# Patient Record
Sex: FEMALE | Race: WHITE | NOT HISPANIC OR LATINO | URBAN - METROPOLITAN AREA
[De-identification: names, ages, dates, MRNs, and addresses within clinical notes are randomized per-mention and may not be internally consistent; named-entity substitution may affect disease eponyms.]

---

## 2017-11-21 ENCOUNTER — IMPORTED ENCOUNTER (OUTPATIENT)
Dept: URBAN - METROPOLITAN AREA CLINIC 50 | Facility: CLINIC | Age: 67
End: 2017-11-21

## 2017-11-30 ENCOUNTER — IMPORTED ENCOUNTER (OUTPATIENT)
Dept: URBAN - METROPOLITAN AREA CLINIC 50 | Facility: CLINIC | Age: 67
End: 2017-11-30

## 2018-07-20 ENCOUNTER — IMPORTED ENCOUNTER (OUTPATIENT)
Dept: URBAN - METROPOLITAN AREA CLINIC 50 | Facility: CLINIC | Age: 68
End: 2018-07-20

## 2018-07-26 ENCOUNTER — IMPORTED ENCOUNTER (OUTPATIENT)
Dept: URBAN - METROPOLITAN AREA CLINIC 50 | Facility: CLINIC | Age: 68
End: 2018-07-26

## 2018-08-06 ENCOUNTER — IMPORTED ENCOUNTER (OUTPATIENT)
Dept: URBAN - METROPOLITAN AREA CLINIC 50 | Facility: CLINIC | Age: 68
End: 2018-08-06

## 2018-08-22 ENCOUNTER — IMPORTED ENCOUNTER (OUTPATIENT)
Dept: URBAN - METROPOLITAN AREA CLINIC 50 | Facility: CLINIC | Age: 68
End: 2018-08-22

## 2018-08-22 NOTE — PATIENT DISCUSSION
"""S/P IOL OS: Sensar AAB00 20.5 +TM. Continue post operative instructions and drops per schedule.  """

## 2018-08-31 ENCOUNTER — IMPORTED ENCOUNTER (OUTPATIENT)
Dept: URBAN - METROPOLITAN AREA CLINIC 50 | Facility: CLINIC | Age: 68
End: 2018-08-31

## 2018-09-05 ENCOUNTER — IMPORTED ENCOUNTER (OUTPATIENT)
Dept: URBAN - METROPOLITAN AREA CLINIC 50 | Facility: CLINIC | Age: 68
End: 2018-09-05

## 2018-09-05 NOTE — PATIENT DISCUSSION
"""S/P IOL OD: Sensar AAB00 20 +TM. Continue post operative instructions and drops per schedule.  """

## 2018-09-14 ENCOUNTER — IMPORTED ENCOUNTER (OUTPATIENT)
Dept: URBAN - METROPOLITAN AREA CLINIC 50 | Facility: CLINIC | Age: 68
End: 2018-09-14

## 2018-10-05 ENCOUNTER — IMPORTED ENCOUNTER (OUTPATIENT)
Dept: URBAN - METROPOLITAN AREA CLINIC 50 | Facility: CLINIC | Age: 68
End: 2018-10-05

## 2019-01-29 ENCOUNTER — IMPORTED ENCOUNTER (OUTPATIENT)
Dept: URBAN - METROPOLITAN AREA CLINIC 50 | Facility: CLINIC | Age: 69
End: 2019-01-29

## 2019-01-31 ENCOUNTER — IMPORTED ENCOUNTER (OUTPATIENT)
Dept: URBAN - METROPOLITAN AREA CLINIC 50 | Facility: CLINIC | Age: 69
End: 2019-01-31

## 2019-02-14 ENCOUNTER — IMPORTED ENCOUNTER (OUTPATIENT)
Dept: URBAN - METROPOLITAN AREA CLINIC 50 | Facility: CLINIC | Age: 69
End: 2019-02-14

## 2020-06-25 ENCOUNTER — IMPORTED ENCOUNTER (OUTPATIENT)
Dept: URBAN - METROPOLITAN AREA CLINIC 50 | Facility: CLINIC | Age: 70
End: 2020-06-25

## 2021-04-18 ASSESSMENT — VISUAL ACUITY
OD_CC: J1+
OS_SC: 20/25-2
OS_CC: 20/25
OS_OTHER: 20/60. 20/100.
OS_CC: J1+
OS_BAT: 20/60
OD_CC: 20/20-1
OS_OTHER: 20/60. 20/60.
OD_SC: 20/25
OD_SC: 20/20
OD_SC: 20/30-
OS_OTHER: 20/60. 20/80.
OS_BAT: 20/60
OD_CC: J1+
OD_OTHER: 20/50. 20/70.
OD_BAT: 20/50
OS_OTHER: 20/60. 20/70.
OS_BAT: 20/60
OS_CC: 20/40-1
OD_PH: 20/30
OS_PH: 20/30
OS_CC: J1+
OS_SC: 20/60
OD_SC: 20/25
OS_PH: 20/25
OS_SC: 20/50
OD_CC: 20/20
OS_BAT: 20/60
OS_CC: 20/60
OS_SC: 20/40
OD_OTHER: 20/60. 20/80.
OS_SC: 20/30-1
OD_BAT: 20/60
OS_SC: 20/25-
OD_SC: 20/20-
OD_OTHER: 20/60. 20/80.
OS_SC: 20/25-
OS_CC: J1+
OD_CC: J1+
OD_OTHER: 20/20. 20/25.
OD_SC: 20/40
OD_BAT: 20/60
OS_CC: J1+
OD_BAT: 20/60
OD_SC: 20/20
OS_SC: 20/40
OD_CC: J1+
OD_OTHER: 20/60. 20/80.
OD_SC: 20/20 PUSH
OD_SC: 20/30
OD_BAT: 20/20

## 2021-04-18 ASSESSMENT — TONOMETRY
OD_IOP_MMHG: 22
OS_IOP_MMHG: 15
OD_IOP_MMHG: 14
OD_IOP_MMHG: 15
OD_IOP_MMHG: 15
OS_IOP_MMHG: 15
OD_IOP_MMHG: 08
OS_IOP_MMHG: 22
OD_IOP_MMHG: 11
OS_IOP_MMHG: 14
OS_IOP_MMHG: 15
OD_IOP_MMHG: 15
OD_IOP_MMHG: 14
OD_IOP_MMHG: 15
OS_IOP_MMHG: 14
OS_IOP_MMHG: 15
OD_IOP_MMHG: 15
OS_IOP_MMHG: 15
OD_IOP_MMHG: 15
OS_IOP_MMHG: 12
OS_IOP_MMHG: 14
OD_IOP_MMHG: 14
OS_IOP_MMHG: 15
OS_IOP_MMHG: 12

## 2021-05-19 ENCOUNTER — COMPREHENSIVE EXAM (OUTPATIENT)
Dept: URBAN - METROPOLITAN AREA CLINIC 49 | Facility: CLINIC | Age: 71
End: 2021-05-19

## 2021-05-19 DIAGNOSIS — H26.491: ICD-10-CM

## 2021-05-19 DIAGNOSIS — H43.813: ICD-10-CM

## 2021-05-19 PROCEDURE — 66821 AFTER CATARACT LASER SURGERY: CPT

## 2021-05-19 PROCEDURE — 92134 CPTRZ OPH DX IMG PST SGM RTA: CPT

## 2021-05-19 PROCEDURE — 92014 COMPRE OPH EXAM EST PT 1/>: CPT

## 2021-05-19 ASSESSMENT — VISUAL ACUITY
OS_CC: 20/40
OU_CC: J2
OD_CC: 20/40-1

## 2021-05-19 ASSESSMENT — TONOMETRY
OD_IOP_MMHG: 14
OD_IOP_MMHG: 11
OS_IOP_MMHG: 14

## 2021-05-19 NOTE — PATIENT DISCUSSION
PCO ([id number] [place] [id number]): Visually significant PCO present on exam today. Recommend YAG laser capsulotomy today  to improve vision and decrease glare symptoms. RBAs of procedure discussed. Patient agrees and wishes to proceed.

## 2021-05-19 NOTE — PROCEDURE NOTE: CLINICAL
PROCEDURE NOTE: YAG Capsulotomy OD. Diagnosis: Posterior Capsular Opacification (PCO). Prep: Mydriacil 1% and Phenylephrine 2.5%. Prior to treatment, the risks/benefits/alternatives were discussed. The patient wished to proceed with procedure. Consent was signed. Proparacaine and brominidine were placed into the operative eye after the eye was dilated. Power = 1.1mJ. Number of pulses = 60. Patient tolerated procedure well and there were no complications. Post Laser instructions given. Rosario Figueroa

## 2021-05-26 ENCOUNTER — YAG POST-OP (OUTPATIENT)
Dept: URBAN - METROPOLITAN AREA CLINIC 49 | Facility: CLINIC | Age: 71
End: 2021-05-26

## 2021-05-26 DIAGNOSIS — H01.026: ICD-10-CM

## 2021-05-26 DIAGNOSIS — H01.023: ICD-10-CM

## 2021-05-26 DIAGNOSIS — Z98.890: ICD-10-CM

## 2021-05-26 PROCEDURE — 99024 POSTOP FOLLOW-UP VISIT: CPT

## 2021-05-26 RX ORDER — ERYTHROMYCIN 5 MG/G: OINTMENT OPHTHALMIC EVERY EVENING

## 2021-05-26 ASSESSMENT — VISUAL ACUITY
OD_SC: 20/20
OS_SC: 20/40
OU_SC: J2

## 2021-05-26 ASSESSMENT — TONOMETRY
OS_IOP_MMHG: 12
OD_IOP_MMHG: 12

## 2021-11-10 ENCOUNTER — PROBLEM (OUTPATIENT)
Dept: URBAN - METROPOLITAN AREA CLINIC 49 | Facility: CLINIC | Age: 71
End: 2021-11-10

## 2021-11-10 DIAGNOSIS — Z98.890: ICD-10-CM

## 2021-11-10 DIAGNOSIS — H26.491: ICD-10-CM

## 2021-11-10 PROCEDURE — 66821 AFTER CATARACT LASER SURGERY: CPT

## 2021-11-10 PROCEDURE — 92014 COMPRE OPH EXAM EST PT 1/>: CPT

## 2021-11-10 ASSESSMENT — TONOMETRY
OS_IOP_MMHG: 12
OD_IOP_MMHG: 12
OD_IOP_MMHG: 11

## 2021-11-10 ASSESSMENT — VISUAL ACUITY
OD_GLARE: 20/80
OD_PH: 20/25
OD_GLARE: 20/50
OD_SC: 20/30
OS_SC: 20/40
OU_CC: J2@16"

## 2021-11-10 NOTE — PROCEDURE NOTE: CLINICAL
PROCEDURE NOTE: YAG Capsulotomy OD. Diagnosis: Posterior Capsular Opacification (PCO). Prep: Mydriacil 1% and Phenylephrine 2.5%. Prior to treatment, the risks/benefits/alternatives were discussed. The patient wished to proceed with procedure. Consent was signed. Proparacaine and brominidine were placed into the operative eye after the eye was dilated. Power = 1.2mJ. Number of pulses = 55. Patient tolerated procedure well and there were no complications. Post Laser instructions given. Queen Acosta

## 2021-12-08 ENCOUNTER — POST-OP (OUTPATIENT)
Dept: URBAN - METROPOLITAN AREA CLINIC 49 | Facility: CLINIC | Age: 71
End: 2021-12-08

## 2021-12-08 DIAGNOSIS — H53.032: ICD-10-CM

## 2021-12-08 DIAGNOSIS — Z98.890: ICD-10-CM

## 2021-12-08 PROCEDURE — 92015 DETERMINE REFRACTIVE STATE: CPT

## 2021-12-08 PROCEDURE — 99024 POSTOP FOLLOW-UP VISIT: CPT

## 2021-12-08 ASSESSMENT — VISUAL ACUITY
OS_SC: 20/40
OD_SC: 20/20
OS_PH: 20/30-2
OU_CC: J1+

## 2021-12-08 ASSESSMENT — TONOMETRY
OS_IOP_MMHG: 12
OD_IOP_MMHG: 12

## 2022-06-01 ENCOUNTER — COMPREHENSIVE EXAM (OUTPATIENT)
Dept: URBAN - METROPOLITAN AREA CLINIC 49 | Facility: CLINIC | Age: 72
End: 2022-06-01

## 2022-06-01 DIAGNOSIS — H04.123: ICD-10-CM

## 2022-06-01 DIAGNOSIS — H35.371: ICD-10-CM

## 2022-06-01 DIAGNOSIS — H43.813: ICD-10-CM

## 2022-06-01 DIAGNOSIS — H53.032: ICD-10-CM

## 2022-06-01 PROCEDURE — 92014 COMPRE OPH EXAM EST PT 1/>: CPT

## 2022-06-01 PROCEDURE — 92134 CPTRZ OPH DX IMG PST SGM RTA: CPT

## 2022-06-01 ASSESSMENT — TONOMETRY
OD_IOP_MMHG: 12
OS_IOP_MMHG: 12

## 2022-06-01 ASSESSMENT — VISUAL ACUITY
OS_PH: 20/30
OD_SC: 20/25
OS_SC: 20/40

## 2023-06-07 ENCOUNTER — COMPREHENSIVE EXAM (OUTPATIENT)
Dept: URBAN - METROPOLITAN AREA CLINIC 49 | Facility: CLINIC | Age: 73
End: 2023-06-07

## 2023-06-07 DIAGNOSIS — H04.123: ICD-10-CM

## 2023-06-07 DIAGNOSIS — H02.88B: ICD-10-CM

## 2023-06-07 DIAGNOSIS — H35.371: ICD-10-CM

## 2023-06-07 DIAGNOSIS — H43.813: ICD-10-CM

## 2023-06-07 DIAGNOSIS — H02.834: ICD-10-CM

## 2023-06-07 DIAGNOSIS — H02.831: ICD-10-CM

## 2023-06-07 DIAGNOSIS — H02.88A: ICD-10-CM

## 2023-06-07 PROCEDURE — 92014 COMPRE OPH EXAM EST PT 1/>: CPT

## 2023-06-07 ASSESSMENT — VISUAL ACUITY
OU_SC: 20/20
OU_CC: J1+
OS_SC: 20/30+2
OD_SC: 20/20-1

## 2023-06-07 ASSESSMENT — TONOMETRY
OS_IOP_MMHG: 12
OD_IOP_MMHG: 13

## 2023-07-19 ENCOUNTER — FOLLOW UP (OUTPATIENT)
Dept: URBAN - METROPOLITAN AREA CLINIC 49 | Facility: CLINIC | Age: 73
End: 2023-07-19

## 2023-07-19 DIAGNOSIS — H02.88A: ICD-10-CM

## 2023-07-19 DIAGNOSIS — H04.123: ICD-10-CM

## 2023-07-19 DIAGNOSIS — H02.88B: ICD-10-CM

## 2023-07-19 PROCEDURE — 92012 INTRM OPH EXAM EST PATIENT: CPT

## 2023-07-19 ASSESSMENT — VISUAL ACUITY
OU_SC: 20/20
OD_SC: 20/20
OS_SC: 20/30

## 2023-07-19 ASSESSMENT — TONOMETRY
OD_IOP_MMHG: 13
OS_IOP_MMHG: 14

## 2024-03-25 NOTE — PATIENT DISCUSSION
Dr. Bell, Instructed to call immediately if any new distortion, blurring, decreased vision or eye pain.